# Patient Record
Sex: FEMALE | Race: WHITE | NOT HISPANIC OR LATINO | Employment: STUDENT | URBAN - METROPOLITAN AREA
[De-identification: names, ages, dates, MRNs, and addresses within clinical notes are randomized per-mention and may not be internally consistent; named-entity substitution may affect disease eponyms.]

---
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
## 2020-09-14 ENCOUNTER — OFFICE VISIT (OUTPATIENT)
Dept: FAMILY MEDICINE CLINIC | Facility: CLINIC | Age: 20
End: 2020-09-14
Payer: COMMERCIAL

## 2020-09-14 VITALS
RESPIRATION RATE: 18 BRPM | HEART RATE: 79 BPM | BODY MASS INDEX: 21.63 KG/M2 | WEIGHT: 134.6 LBS | OXYGEN SATURATION: 98 % | DIASTOLIC BLOOD PRESSURE: 50 MMHG | SYSTOLIC BLOOD PRESSURE: 92 MMHG | TEMPERATURE: 97.7 F | HEIGHT: 66 IN

## 2020-09-14 DIAGNOSIS — Z30.011 BCP (BIRTH CONTROL PILLS) INITIATION: ICD-10-CM

## 2020-09-14 DIAGNOSIS — Z00.00 ANNUAL PHYSICAL EXAM: Primary | ICD-10-CM

## 2020-09-14 DIAGNOSIS — Z30.09 BIRTH CONTROL COUNSELING: ICD-10-CM

## 2020-09-14 DIAGNOSIS — Z23 ENCOUNTER FOR IMMUNIZATION: ICD-10-CM

## 2020-09-14 PROBLEM — F12.90 MARIJUANA SMOKER, CONTINUOUS: Status: RESOLVED | Noted: 2018-11-05 | Resolved: 2020-09-14

## 2020-09-14 PROCEDURE — 90651 9VHPV VACCINE 2/3 DOSE IM: CPT | Performed by: FAMILY MEDICINE

## 2020-09-14 PROCEDURE — 90471 IMMUNIZATION ADMIN: CPT | Performed by: FAMILY MEDICINE

## 2020-09-14 PROCEDURE — 3725F SCREEN DEPRESSION PERFORMED: CPT | Performed by: FAMILY MEDICINE

## 2020-09-14 PROCEDURE — 99395 PREV VISIT EST AGE 18-39: CPT | Performed by: FAMILY MEDICINE

## 2020-09-14 RX ORDER — NORGESTIMATE AND ETHINYL ESTRADIOL 7DAYSX3 LO
1 KIT ORAL DAILY
Qty: 28 TABLET | Refills: 5 | Status: SHIPPED | OUTPATIENT
Start: 2020-09-14 | End: 2021-03-03 | Stop reason: SDUPTHER

## 2020-09-14 NOTE — PROGRESS NOTES
[facility]    NAME: Kely Steele  AGE: 21 y o  SEX: female  : 2000     DATE: 2020     Assessment and Plan:     Problem List Items Addressed This Visit     None      Visit Diagnoses     Annual physical exam    -  Primary    Encounter for immunization        Relevant Orders    HPV VACCINE 9 VALENT IM (Completed)    Birth control counseling        Relevant Medications    norgestimate-ethinyl estradiol (Ortho Tri-Cyclen Lo) 0 18/0 215/0 25 MG-25 MCG per tablet    BCP (birth control pills) initiation        Relevant Medications    norgestimate-ethinyl estradiol (Ortho Tri-Cyclen Lo) 0 18/0 215/0 25 MG-25 MCG per tablet          Immunizations and preventive care screenings were discussed with patient today  Appropriate education was printed on patient's after visit summary  Counseling:  Alcohol/drug use: discussed moderation in alcohol intake, the recommendations for healthy alcohol use, and avoidance of illicit drug use  Dental Health: discussed importance of regular tooth brushing, flossing, and dental visits  Injury prevention: discussed safety/seat belts, safety helmets, smoke detectors, carbon dioxide detectors, and smoking near bedding or upholstery  Sexual health: discussed sexually transmitted diseases, partner selection, use of condoms, avoidance of unintended pregnancy, and contraceptive alternatives  · Exercise: the importance of regular exercise/physical activity was discussed  Recommend exercise 3-5 times per week for at least 30 minutes  Return in about 1 year (around 2021)  Chief Complaint:     Chief Complaint   Patient presents with    Physical Exam      History of Present Illness:     Adult Annual Physical   Patient here for a comprehensive physical exam  The patient reports no problems  Diet and Physical Activity  · Diet/Nutrition: well balanced diet  · Exercise: no formal exercise  Depression Screening  PHQ-9 Depression Screening    PHQ-9:    Frequency of the following problems over the past two weeks:       Little interest or pleasure in doing things:  0 - not at all  Feeling down, depressed, or hopeless:  0 - not at all  PHQ-2 Score:  0       General Health  · Sleep: sleeps well  · Hearing: normal - bilateral   · Vision: no vision problems  · Dental: regular dental visits and brushes teeth twice daily  /GYN Health  · Last menstrual period: 9/9/2020  · Contraceptive method: barrier methods  · History of STDs?: no      Review of Systems:     Review of Systems   Constitutional: Negative for appetite change, chills, fatigue and fever  HENT: Negative  Respiratory: Negative for apnea, cough, shortness of breath and wheezing  Cardiovascular: Negative for chest pain, palpitations and leg swelling  Gastrointestinal: Negative for abdominal pain, constipation, diarrhea, nausea and vomiting  Genitourinary: Negative for decreased urine volume, dysuria, frequency, hematuria, urgency, vaginal bleeding, vaginal discharge and vaginal pain  Skin: Negative for color change and rash  Neurological: Negative  Psychiatric/Behavioral: Negative  Past Medical History:     Past Medical History:   Diagnosis Date    Ear lump     last assessed 2/27/15    Exercise-induced bronchospasm     No known problems 08/01/2014      Past Surgical History:     History reviewed  No pertinent surgical history     Social History:        Social History     Socioeconomic History    Marital status: Single     Spouse name: None    Number of children: None    Years of education: None    Highest education level: None   Occupational History    None   Social Needs    Financial resource strain: None    Food insecurity     Worry: None     Inability: None    Transportation needs     Medical: None     Non-medical: None   Tobacco Use    Smoking status: Never Smoker    Smokeless tobacco: Never Used Substance and Sexual Activity    Alcohol use: Yes     Comment: rare    Drug use: No    Sexual activity: None   Lifestyle    Physical activity     Days per week: None     Minutes per session: None    Stress: None   Relationships    Social connections     Talks on phone: None     Gets together: None     Attends Advent service: None     Active member of club or organization: None     Attends meetings of clubs or organizations: None     Relationship status: None    Intimate partner violence     Fear of current or ex partner: None     Emotionally abused: None     Physically abused: None     Forced sexual activity: None   Other Topics Concern    None   Social History Narrative    Currently in school    Lives with parents (living together,)      Family History:     Family History   Problem Relation Age of Onset    Asthma Mother     Diabetes Mother     Lung cancer Mother     Asthma Father     Diabetes Father     Lung cancer Father       Current Medications:     Current Outpatient Medications   Medication Sig Dispense Refill    erythromycin with ethanol (THERAMYCIN) 2 % external solution Apply topically daily as needed (cystic acne) 60 mL 3    BENZOYL PEROXIDE 10 % external wash APPLY 1 APPLICATION TOPICALLY AT BEDTIME AS NEEDED FOR ACNE  1    benzoyl peroxide 5 % gel APPLY THIN LAYER TOPICALLY AT BEDTIME  3    Benzoyl Peroxide 6 % LOTN Apply topically daily at bedtime (Patient not taking: Reported on 9/14/2020) 50 g 3    doxycycline monohydrate (MONODOX) 50 mg capsule Take 50 mg by mouth 2 (two) times a day  1    norgestimate-ethinyl estradiol (Ortho Tri-Cyclen Lo) 0 18/0 215/0 25 MG-25 MCG per tablet Take 1 tablet by mouth daily 28 tablet 5     No current facility-administered medications for this visit         Allergies:     No Known Allergies   Physical Exam:     BP 92/50   Pulse 79   Temp 97 7 °F (36 5 °C)   Resp 18   Ht 5' 5 8" (1 671 m)   Wt 61 1 kg (134 lb 9 6 oz)   SpO2 98% BMI 21 86 kg/m²     Physical Exam  Vitals signs and nursing note reviewed  Constitutional:       General: She is not in acute distress  Appearance: Normal appearance  She is well-developed and normal weight  She is not ill-appearing, toxic-appearing or diaphoretic  HENT:      Head: Normocephalic and atraumatic  Right Ear: Tympanic membrane, ear canal and external ear normal  There is no impacted cerumen  Left Ear: Tympanic membrane, ear canal and external ear normal  There is no impacted cerumen  Nose: Nose normal  No congestion or rhinorrhea  Mouth/Throat:      Pharynx: No oropharyngeal exudate or posterior oropharyngeal erythema  Eyes:      General: No scleral icterus  Right eye: No discharge  Left eye: No discharge  Conjunctiva/sclera: Conjunctivae normal    Neck:      Musculoskeletal: Normal range of motion and neck supple  Thyroid: No thyromegaly  Vascular: No JVD  Trachea: No tracheal deviation  Cardiovascular:      Rate and Rhythm: Normal rate and regular rhythm  Heart sounds: Normal heart sounds  No murmur  No friction rub  No gallop  Pulmonary:      Effort: Pulmonary effort is normal  No respiratory distress  Breath sounds: Normal breath sounds  No stridor  No wheezing  Abdominal:      General: Bowel sounds are normal       Palpations: Abdomen is soft  There is no mass  Tenderness: There is no abdominal tenderness  Musculoskeletal: Normal range of motion  General: No tenderness or deformity  Lymphadenopathy:      Cervical: No cervical adenopathy  Skin:     General: Skin is warm  Coloration: Skin is not pale  Findings: No erythema  Neurological:      General: No focal deficit present  Mental Status: She is alert and oriented to person, place, and time  Mental status is at baseline     Psychiatric:         Mood and Affect: Mood normal          Behavior: Behavior normal          Thought Content: Thought content normal          Judgment: Judgment normal           Jose Cortes, DO   [address]

## 2020-09-15 ENCOUNTER — TELEMEDICINE (OUTPATIENT)
Dept: FAMILY MEDICINE CLINIC | Facility: CLINIC | Age: 20
End: 2020-09-15
Payer: COMMERCIAL

## 2020-09-15 DIAGNOSIS — R09.81 NASAL CONGESTION: ICD-10-CM

## 2020-09-15 DIAGNOSIS — R09.82 POST-NASAL DRIP: Primary | ICD-10-CM

## 2020-09-15 DIAGNOSIS — J34.89 RHINORRHEA: ICD-10-CM

## 2020-09-15 PROCEDURE — 99213 OFFICE O/P EST LOW 20 MIN: CPT | Performed by: FAMILY MEDICINE

## 2020-09-15 PROCEDURE — 1036F TOBACCO NON-USER: CPT | Performed by: FAMILY MEDICINE

## 2020-09-15 RX ORDER — FLUTICASONE PROPIONATE 50 MCG
1 SPRAY, SUSPENSION (ML) NASAL DAILY
Qty: 1 BOTTLE | Refills: 2 | Status: SHIPPED | OUTPATIENT
Start: 2020-09-15 | End: 2022-04-15

## 2020-09-15 NOTE — PROGRESS NOTES
Virtual Brief Visit    Assessment/Plan:    Problem List Items Addressed This Visit     None      Visit Diagnoses     Post-nasal drip    -  Primary    Relevant Medications    fluticasone (FLONASE) 50 mcg/act nasal spray    Rhinorrhea        Relevant Medications    fluticasone (FLONASE) 50 mcg/act nasal spray    Nasal congestion            Viral URI  - Conservative measures discussed including humidifier use for congestion and warm water/tea with honey for sore throat  - Discussed the importance of avoiding unnecessary antibiotic therapy  - Tylenol or Motrin prn for fevers  - Flonase ordered   - Call in 2-3 days if symptoms aren't improving, return precautions discussed  - Follow up as needed  Reason for visit is   Chief Complaint   Patient presents with    Virtual Brief Visit        Encounter provider Grace Meraz DO    Provider located at [address] [place] [phone]    Recent Visits  Date Type Provider Dept   09/15/20 Telemedicine Grace Meraz DO [id number]   09/14/20 Office Visit Grace Meraz DO  [facility]   Showing recent visits within past 7 days and meeting all other requirements     Future Appointments  No visits were found meeting these conditions  Showing future appointments within next 150 days and meeting all other requirements        After connecting through telephone and patient was informed that this is not a secure, HIPAA-complaint platform  She agrees to proceed  , the patient was identified by name and date of birth  Vicky Colbert was informed that this is a telemedicine visit and that the visit is being conducted through telephone and patient was informed that this is not a secure, HIPAA-complaint platform  She agrees to proceed     My office door was closed  No one else was in the room  She acknowledged consent and understanding of privacy and security of the platform   The patient has agreed to participate and understands she can discontinue the visit at any time  Patient is aware this is a billable service  Howie Figueroa is a 21 y o  female   21year old female is seen via telemedicine telephone call complaining of nasal congestion  Symptoms include congestion, nasal congestion, no  fever, post nasal drip and rhinorrhea  Onset of symptoms was 2 days ago, and has been unchanged since that time  Treatment to date: none  Past Medical History:   Diagnosis Date    Ear lump     last assessed 2/27/15    Exercise-induced bronchospasm     No known problems 08/01/2014       No past surgical history on file  Current Outpatient Medications   Medication Sig Dispense Refill    BENZOYL PEROXIDE 10 % external wash APPLY 1 APPLICATION TOPICALLY AT BEDTIME AS NEEDED FOR ACNE  1    benzoyl peroxide 5 % gel APPLY THIN LAYER TOPICALLY AT BEDTIME  3    Benzoyl Peroxide 6 % LOTN Apply topically daily at bedtime (Patient not taking: Reported on 9/14/2020) 50 g 3    doxycycline monohydrate (MONODOX) 50 mg capsule Take 50 mg by mouth 2 (two) times a day  1    erythromycin with ethanol (THERAMYCIN) 2 % external solution Apply topically daily as needed (cystic acne) 60 mL 3    fluticasone (FLONASE) 50 mcg/act nasal spray 1 spray into each nostril daily 1 Bottle 2    norgestimate-ethinyl estradiol (Ortho Tri-Cyclen Lo) 0 18/0 215/0 25 MG-25 MCG per tablet Take 1 tablet by mouth daily 28 tablet 5     No current facility-administered medications for this visit  No Known Allergies    Review of Systems  As per HPI     There were no vitals filed for this visit  I spent 15 minutes directly with the patient during this visit    It was my intent to perform this visit via video technology but the patient was not able to do a video connection so the visit was completed via audio telephone only    VIRTUAL VISIT DISCLAIMER    Kely Ivette acknowledges that she has consented to an online visit or consultation  She understands that the online visit is based solely on information provided by her, and that, in the absence of a face-to-face physical evaluation by the physician, the diagnosis she receives is both limited and provisional in terms of accuracy and completeness  This is not intended to replace a full medical face-to-face evaluation by the physician  Kely Steele understands and accepts these terms

## 2021-03-03 DIAGNOSIS — Z30.09 BIRTH CONTROL COUNSELING: ICD-10-CM

## 2021-03-03 DIAGNOSIS — Z30.011 BCP (BIRTH CONTROL PILLS) INITIATION: ICD-10-CM

## 2021-03-03 NOTE — TELEPHONE ENCOUNTER
Refill request for norgestimate-ethinyl estradiol to be sent to [address] [place] [address]

## 2021-03-04 RX ORDER — NORGESTIMATE AND ETHINYL ESTRADIOL 7DAYSX3 LO
1 KIT ORAL DAILY
Qty: 28 TABLET | Refills: 5 | Status: SHIPPED | OUTPATIENT
Start: 2021-03-04 | End: 2021-08-09 | Stop reason: SDUPTHER

## 2021-08-09 DIAGNOSIS — Z30.09 BIRTH CONTROL COUNSELING: ICD-10-CM

## 2021-08-09 DIAGNOSIS — Z30.011 BCP (BIRTH CONTROL PILLS) INITIATION: ICD-10-CM

## 2021-08-10 ENCOUNTER — TELEPHONE (OUTPATIENT)
Dept: FAMILY MEDICINE CLINIC | Facility: CLINIC | Age: 21
End: 2021-08-10

## 2021-08-10 RX ORDER — NORGESTIMATE AND ETHINYL ESTRADIOL 7DAYSX3 LO
1 KIT ORAL DAILY
Qty: 28 TABLET | Refills: 5 | Status: SHIPPED | OUTPATIENT
Start: 2021-08-10

## 2021-08-10 NOTE — TELEPHONE ENCOUNTER
----- Message from Nick Bansal DO sent at 8/10/2021  8:43 AM EDT -----  Regarding: needs appt  Hi,     Please make an appointment for this patient to address contraception surveillance next month for further refills   Thanks     -Dr David Cantu

## 2021-08-10 NOTE — TELEPHONE ENCOUNTER
Left patient a VM asking to please call the office to set up an apt      ----- Message from Darlene Heath DO sent at 8/10/2021  8:43 AM EDT -----  Regarding: needs appt  Hi,     Please make an appointment for this patient to address contraception surveillance next month for further refills   Thanks     -Dr Puneet Badillo

## 2021-09-14 ENCOUNTER — VBI (OUTPATIENT)
Dept: ADMINISTRATIVE | Facility: OTHER | Age: 21
End: 2021-09-14

## 2021-09-16 ENCOUNTER — OFFICE VISIT (OUTPATIENT)
Dept: FAMILY MEDICINE CLINIC | Facility: CLINIC | Age: 21
End: 2021-09-16
Payer: COMMERCIAL

## 2021-09-16 VITALS
HEART RATE: 84 BPM | TEMPERATURE: 98.6 F | HEIGHT: 66 IN | DIASTOLIC BLOOD PRESSURE: 70 MMHG | BODY MASS INDEX: 24.75 KG/M2 | SYSTOLIC BLOOD PRESSURE: 118 MMHG | WEIGHT: 154 LBS | RESPIRATION RATE: 20 BRPM | OXYGEN SATURATION: 97 %

## 2021-09-16 DIAGNOSIS — Z23 ENCOUNTER FOR IMMUNIZATION: Primary | ICD-10-CM

## 2021-09-16 DIAGNOSIS — Z00.00 ANNUAL PHYSICAL EXAM: ICD-10-CM

## 2021-09-16 PROCEDURE — 90471 IMMUNIZATION ADMIN: CPT | Performed by: FAMILY MEDICINE

## 2021-09-16 PROCEDURE — 90715 TDAP VACCINE 7 YRS/> IM: CPT | Performed by: FAMILY MEDICINE

## 2021-09-16 PROCEDURE — 3725F SCREEN DEPRESSION PERFORMED: CPT | Performed by: FAMILY MEDICINE

## 2021-09-16 PROCEDURE — 90472 IMMUNIZATION ADMIN EACH ADD: CPT | Performed by: FAMILY MEDICINE

## 2021-09-16 PROCEDURE — 99395 PREV VISIT EST AGE 18-39: CPT | Performed by: FAMILY MEDICINE

## 2021-09-16 PROCEDURE — 3008F BODY MASS INDEX DOCD: CPT | Performed by: FAMILY MEDICINE

## 2021-09-16 PROCEDURE — 90621 MENB-FHBP VACC 2/3 DOSE IM: CPT | Performed by: FAMILY MEDICINE

## 2021-09-16 NOTE — PROGRESS NOTES
[facility]    NAME: Kely Steele  AGE: 24 y o  SEX: female  : 2000     DATE: 2021     Assessment and Plan:     Problem List Items Addressed This Visit     None      Visit Diagnoses     Encounter for immunization    -  Primary    Relevant Orders    Tdap vaccine greater than or equal to 8yo IM (Completed)    MENINGOCOCCAL B RECOMBINANT (Completed)        1  Encounter for immunization  - Tdap vaccine greater than or equal to 8yo IM  - MENINGOCOCCAL B RECOMBINANT    2  Annual physical exam          Immunizations and preventive care screenings were discussed with patient today  Appropriate education was printed on patient's after visit summary  Counseling:  Alcohol/drug use: discussed moderation in alcohol intake, the recommendations for healthy alcohol use, and avoidance of illicit drug use  Dental Health: discussed importance of regular tooth brushing, flossing, and dental visits  Sexual health: discussed sexually transmitted diseases, partner selection, use of condoms, avoidance of unintended pregnancy, and contraceptive alternatives  · Exercise: the importance of regular exercise/physical activity was discussed  Recommend exercise 3-5 times per week for at least 30 minutes  No follow-ups on file  Chief Complaint:     Chief Complaint   Patient presents with    Follow-up      History of Present Illness:     Adult Annual Physical   Patient here for a comprehensive physical exam  The patient reports no complaints  She is currently working and looking to go to [employer]  Diet and Physical Activity  · Diet/Nutrition: well balanced diet and limited junk food  · Exercise: no formal exercise        Depression Screening  PHQ-9 Depression Screening    PHQ-9:   Frequency of the following problems over the past two weeks:      Little interest or pleasure in doing things: 0 - not at all  Feeling down, depressed, or hopeless: 0 - not at all  PHQ-2 Score: 0       General Health  · Sleep: patient wakes up about 3-4 times a night, tried melatonin but states she does wake up  even with it  · Hearing: normal - bilateral   · Vision: no vision problems  · Dental: regular dental visits  /GYN Health  · Last menstrual period: 9/13/21  · Contraceptive method: oral contraceptives  · History of STDs?: no      Review of Systems:     Review of Systems   All other systems reviewed and are negative  Past Medical History:     Past Medical History:   Diagnosis Date    Ear lump     last assessed 2/27/15    Exercise-induced bronchospasm     No known problems 08/01/2014      Past Surgical History:     No past surgical history on file  Social History:     Social History     Socioeconomic History    Marital status: Single     Spouse name: Not on file    Number of children: Not on file    Years of education: Not on file    Highest education level: Not on file   Occupational History    Not on file   Tobacco Use    Smoking status: Never Smoker    Smokeless tobacco: Never Used   Substance and Sexual Activity    Alcohol use: Yes     Comment: rare    Drug use: No    Sexual activity: Not on file   Other Topics Concern    Not on file   Social History Narrative    Currently in school    Lives with parents (living together,)     Social Determinants of Health     Financial Resource Strain:     Difficulty of Paying Living Expenses:    Food Insecurity:     Worried About Running Out of Food in the Last Year:     [address] in the Last Year:    Transportation Needs:     Lack of Transportation (Medical):      Lack of Transportation (Non-Medical):    Physical Activity:     Days of Exercise per Week:     Minutes of Exercise per Session:    Stress:     Feeling of Stress :    Social Connections:     Frequency of Communication with Friends and Family:     Frequency of Social Gatherings with Friends and Family:     Attends [employer] Services:     Active Member of Clubs or Organizations:     Attends Club or Organization Meetings:     Marital Status:    Intimate Partner Violence:     Fear of Current or Ex-Partner:     Emotionally Abused:     Physically Abused:     Sexually Abused:       Family History:     Family History   Problem Relation Age of Onset    Asthma Mother     Diabetes Mother     Lung cancer Mother     Asthma Father     Diabetes Father     Lung cancer Father       Current Medications:     Current Outpatient Medications   Medication Sig Dispense Refill    norgestimate-ethinyl estradiol (Ortho Tri-Cyclen Lo) 0 18/0 215/0 25 MG-25 MCG per tablet Take 1 tablet by mouth daily 28 tablet 5    BENZOYL PEROXIDE 10 % external wash APPLY 1 APPLICATION TOPICALLY AT BEDTIME AS NEEDED FOR ACNE (Patient not taking: Reported on 9/16/2021)  1    benzoyl peroxide 5 % gel APPLY THIN LAYER TOPICALLY AT BEDTIME (Patient not taking: Reported on 9/16/2021)  3    Benzoyl Peroxide 6 % LOTN Apply topically daily at bedtime (Patient not taking: Reported on 9/14/2020) 50 g 3    doxycycline monohydrate (MONODOX) 50 mg capsule Take 50 mg by mouth 2 (two) times a day (Patient not taking: Reported on 9/16/2021)  1    erythromycin with ethanol (THERAMYCIN) 2 % external solution Apply topically daily as needed (cystic acne) (Patient not taking: Reported on 9/16/2021) 60 mL 3    fluticasone (FLONASE) 50 mcg/act nasal spray 1 spray into each nostril daily (Patient not taking: Reported on 9/16/2021) 1 Bottle 2     No current facility-administered medications for this visit  Allergies:     No Known Allergies   Physical Exam:     /70   Pulse 84   Temp 98 6 °F (37 °C)   Resp 20   Ht 5' 6" (1 676 m)   Wt 69 9 kg (154 lb)   SpO2 97%   BMI 24 86 kg/m²     Physical Exam  Constitutional:       Appearance: Normal appearance  HENT:      Head: Normocephalic and atraumatic        Right Ear: Tympanic membrane, ear canal and external ear normal  There is no impacted cerumen  Left Ear: Tympanic membrane, ear canal and external ear normal  There is no impacted cerumen  Cardiovascular:      Rate and Rhythm: Normal rate and regular rhythm  Pulses: Normal pulses  Heart sounds: Normal heart sounds  Pulmonary:      Effort: Pulmonary effort is normal       Breath sounds: Normal breath sounds  Abdominal:      General: Bowel sounds are normal  There is no distension  Tenderness: There is no abdominal tenderness  Musculoskeletal:      Cervical back: Normal range of motion  Neurological:      General: No focal deficit present  Mental Status: She is alert and oriented to person, place, and time  Psychiatric:         Mood and Affect: Mood normal          Behavior: Behavior normal          Thought Content:  Thought content normal          Judgment: Judgment normal           Fatou Dey MD   [address]

## 2022-04-15 ENCOUNTER — TELEMEDICINE (OUTPATIENT)
Dept: FAMILY MEDICINE CLINIC | Facility: CLINIC | Age: 22
End: 2022-04-15
Payer: COMMERCIAL

## 2022-04-15 DIAGNOSIS — J32.9 SINUSITIS, UNSPECIFIED CHRONICITY, UNSPECIFIED LOCATION: Primary | ICD-10-CM

## 2022-04-15 PROCEDURE — 99212 OFFICE O/P EST SF 10 MIN: CPT | Performed by: FAMILY MEDICINE

## 2022-04-15 RX ORDER — FLUTICASONE PROPIONATE 50 MCG
1 SPRAY, SUSPENSION (ML) NASAL DAILY
Qty: 11.1 ML | Refills: 0 | Status: SHIPPED | OUTPATIENT
Start: 2022-04-15

## 2022-04-15 NOTE — PROGRESS NOTES
COVID-19 Outpatient Progress Note    Assessment/Plan:    Problem List Items Addressed This Visit     None         COVID-19 Plan   Encounter provider Anjana Sanchez MD    Provider located at [address] [place] [phone]    Recent Visits  No visits were found meeting these conditions  Showing recent visits within past 7 days and meeting all other requirements  Today's Visits  Date Type Provider Dept   04/15/22 Telemedicine Anjana Sanchez MD [facility]   Showing today's visits and meeting all other requirements  Future Appointments  No visits were found meeting these conditions  Showing future appointments within next 150 days and meeting all other requirements     COVID-19 HPI  Lab Results   Component Value Date    SARSCOV2 NOT DETECTED 08/16/2021     Past Medical History:   Diagnosis Date    Ear lump     last assessed 2/27/15    Exercise-induced bronchospasm     No known problems 08/01/2014     No past surgical history on file    Current Outpatient Medications   Medication Sig Dispense Refill    BENZOYL PEROXIDE 10 % external wash APPLY 1 APPLICATION TOPICALLY AT BEDTIME AS NEEDED FOR ACNE (Patient not taking: Reported on 9/16/2021)  1    benzoyl peroxide 5 % gel APPLY THIN LAYER TOPICALLY AT BEDTIME (Patient not taking: Reported on 9/16/2021)  3    Benzoyl Peroxide 6 % LOTN Apply topically daily at bedtime (Patient not taking: Reported on 9/14/2020) 50 g 3    doxycycline monohydrate (MONODOX) 50 mg capsule Take 50 mg by mouth 2 (two) times a day (Patient not taking: Reported on 9/16/2021)  1    erythromycin with ethanol (THERAMYCIN) 2 % external solution Apply topically daily as needed (cystic acne) (Patient not taking: Reported on 9/16/2021) 60 mL 3    fluticasone (FLONASE) 50 mcg/act nasal spray 1 spray into each nostril daily (Patient not taking: Reported on 9/16/2021) 1 Bottle 2    norgestimate-ethinyl estradiol (Ortho Tri-Cyclen Lo) 0 18/0 215/0 25 MG-25 MCG per tablet Take 1 tablet by mouth daily 28 tablet 5     No current facility-administered medications for this visit  No Known Allergies    Review of Systems  Objective: There were no vitals filed for this visit  Physical Exam    VIRTUAL VISIT DISCLAIMER    Kely Steele verbally agrees to participate in [employer]  Pt is aware that [employer] could be limited without vital signs or the ability to perform a full hands-on physical exam  Kely Steele understands she or the provider may request at any time to terminate the video visit and request the patient to seek care or treatment in person

## 2022-04-15 NOTE — PROGRESS NOTES
[id number]  Akshat Raman MD, 04/15/22     Brit Orozco MRN: [id number] : 2000 Age: 24 y o  Assessment/Plan        1  Sinusitis, unspecified chronicity, unspecified location  fluticasone (FLONASE) 50 mcg/act nasal spray         ***    Kelysuzan Steele acknowledged understanding of treatment plan, all questions answered  Reminded of office on-call physician availability  for any concerns  Plan discussed with attending physician Dr Eddie Ferrer Saba Salgado is a 24 y o  female  Patient complains of feeling ill for several days  She was at a [employer] party on Saturday evening, reports that persons at party have been diagnosed with bacterial infection also had black mold at house  She went for hike on Tuesday, felt ill after  Body aches, fevers, chills  TMax 101 5  Today patient reports that she is feeling better  Had COVID previously, unvaccinated  The following portions of the patient's history were reviewed and updated as appropriate: allergies, current medications, past family history, past medical history, past social history, past surgical history and problem list     Review of Systems   Constitutional: Positive for fever  Negative for chills  HENT: Positive for rhinorrhea and sinus pressure  Musculoskeletal: Positive for myalgias  Negative for arthralgias           Past Medical History:   Diagnosis Date    Ear lump     last assessed 2/27/15    Exercise-induced bronchospasm     No known problems 2014       Current Outpatient Medications   Medication Sig Dispense Refill    BENZOYL PEROXIDE 10 % external wash APPLY 1 APPLICATION TOPICALLY AT BEDTIME AS NEEDED FOR ACNE (Patient not taking: Reported on 2021)  1    benzoyl peroxide 5 % gel APPLY THIN LAYER TOPICALLY AT BEDTIME (Patient not taking: Reported on 2021)  3    Benzoyl Peroxide 6 % LOTN Apply topically daily at bedtime (Patient not taking: Reported on 2020) 50 g 3    doxycycline monohydrate (MONODOX) 50 mg capsule Take 50 mg by mouth 2 (two) times a day (Patient not taking: Reported on 9/16/2021)  1    erythromycin with ethanol (THERAMYCIN) 2 % external solution Apply topically daily as needed (cystic acne) (Patient not taking: Reported on 9/16/2021) 60 mL 3    fluticasone (FLONASE) 50 mcg/act nasal spray 1 spray into each nostril daily (Patient not taking: Reported on 9/16/2021) 1 Bottle 2    norgestimate-ethinyl estradiol (Ortho Tri-Cyclen Lo) 0 18/0 215/0 25 MG-25 MCG per tablet Take 1 tablet by mouth daily 28 tablet 5     No current facility-administered medications for this visit  Objective      There were no vitals taken for this visit      Physical Exam

## 2022-04-19 NOTE — PROGRESS NOTES
Virtual Regular Visit    Verification of patient location:    Patient is located in the following state in which I hold an active license [place]      Assessment/Plan:    Problem List Items Addressed This Visit     None      Visit Diagnoses     Sinusitis, unspecified chronicity, unspecified location    -  Primary    Relevant Medications    fluticasone (FLONASE) 50 mcg/act nasal spray        25 y/o female with URI symptoms starting few days ago, now feeling better without treatment  Patient concerned that she was at party, where other people reportedly diagnosed with bacterial infection  · As patient is feeling better today, it is unlikely that this a bacterial infection  No need for antibiotics today  · For congestion, patient can use flonase  Order provided  · Continue symptomatic treatment  · Advised patient to call for any worsening symptoms  Reason for visit is   Chief Complaint   Patient presents with    COVID-19    Virtual Regular Visit        Encounter provider Melinda Lan MD    Provider located at [address] [place] [phone]      Recent Visits  Date Type Provider [facility]   [facility]   Showing recent visits within past 7 days and meeting all other requirements  Future Appointments  No visits were found meeting these conditions  Showing future appointments within next 150 days and meeting all other requirements       The patient was identified by name and date of birth  Raz Cardenas was informed that this is a telemedicine visit and that the visit is being conducted through Telephone  My office door was closed  No one else was in the room  She acknowledged consent and understanding of privacy and security of the video platform  The patient has agreed to participate and understands they can discontinue the visit at any time      Patient is aware this is a billable service  It was my intent to perform this visit via video technology but the patient was not able to do a video connection so the visit was completed via audio telephone only  Khloe Farnsworth is a 24 y o  female   Patient complains of congestion, body aches, fever, and chills starting 4/12  Patient reports that prior to feeling ill, she went to a [employer] party on Saturday evening, reports that she was informed that other persons at party have been diagnosed with bacterial infection also that house had black mold  Patient unsure what type of infection the other people at the party had  Patient felt well initially after the party, she then started developing symptoms after she went for a hike on Tuesday  She checked her temperature at home, found to febrile to 101 5  Patient reports that today she is feeling better  She has not had any recurrent fevers  She has some persistent congestion, but otherwise feels ok  Had COVID previously, is unvaccinated  Past Medical History:   Diagnosis Date    Ear lump     last assessed 2/27/15    Exercise-induced bronchospasm     No known problems 08/01/2014       No past surgical history on file  Current Outpatient Medications   Medication Sig Dispense Refill    fluticasone (FLONASE) 50 mcg/act nasal spray 1 spray into each nostril daily 11 1 mL 0    norgestimate-ethinyl estradiol (Ortho Tri-Cyclen Lo) 0 18/0 215/0 25 MG-25 MCG per tablet Take 1 tablet by mouth daily 28 tablet 5     No current facility-administered medications for this visit  No Known Allergies    Review of Systems    Video Exam    There were no vitals filed for this visit  Physical Exam     I spent 15 minutes directly with the patient during this visit    VIRTUAL VISIT DISCLAIMER      Kely Steele verbally agrees to participate in [employer]   Pt is aware that [employer] could be limited without vital signs or the ability to perform a full hands-on physical maritza Dey understands she or the provider may request at any time to terminate the video visit and request the patient to seek care or treatment in person

## 2022-09-26 ENCOUNTER — VBI (OUTPATIENT)
Dept: ADMINISTRATIVE | Facility: OTHER | Age: 22
End: 2022-09-26

## 2022-10-31 ENCOUNTER — OFFICE VISIT (OUTPATIENT)
Dept: FAMILY MEDICINE CLINIC | Facility: CLINIC | Age: 22
End: 2022-10-31

## 2022-10-31 VITALS
HEART RATE: 70 BPM | HEIGHT: 66 IN | WEIGHT: 164.44 LBS | OXYGEN SATURATION: 98 % | DIASTOLIC BLOOD PRESSURE: 64 MMHG | BODY MASS INDEX: 26.43 KG/M2 | RESPIRATION RATE: 20 BRPM | TEMPERATURE: 98.1 F | SYSTOLIC BLOOD PRESSURE: 106 MMHG

## 2022-10-31 DIAGNOSIS — Z00.00 ANNUAL PHYSICAL EXAM: ICD-10-CM

## 2022-10-31 DIAGNOSIS — Z23 ENCOUNTER FOR IMMUNIZATION: Primary | ICD-10-CM

## 2022-10-31 DIAGNOSIS — E66.3 OVERWEIGHT (BMI 25.0-29.9): ICD-10-CM

## 2022-10-31 NOTE — PROGRESS NOTES
[address] [facility]    NAME: Kely Steele  AGE: 25 y o  SEX: female  : 2000     DATE: 2022     Assessment and Plan:     Problem List Items Addressed This Visit    None     Visit Diagnoses     Encounter for immunization    -  Primary    Relevant Orders    influenza vaccine, quadrivalent, 0 5 mL, preservative-free, for adult and pediatric patients 6 mos+ (AFLURIA, FLUARIX, FLULAVAL, FLUZONE) (Completed)    Overweight (BMI 25 0-29  9)        Relevant Orders    CBC and differential    Lipid Panel with Direct LDL reflex    TSH, 3rd generation with Free T4 reflex    Comprehensive metabolic panel    Annual physical exam        Relevant Orders    CBC and differential    Lipid Panel with Direct LDL reflex    TSH, 3rd generation with Free T4 reflex    Comprehensive metabolic panel          Immunizations and preventive care screenings were discussed with patient today  Appropriate education was printed on patient's after visit summary  ·          No follow-ups on file  Chief Complaint:     Chief Complaint   Patient presents with   • Physical Exam     No questions or concerns      History of Present Illness:     Adult Annual Physical   Patient here for a comprehensive physical exam  The patient reports no problems  Occasional drinking and no smoking    Diet and Physical Activity  · Diet/Nutrition: well balanced diet, limited junk food and consuming 3-5 servings of fruits/vegetables daily  · Exercise: no formal exercise        Depression Screening  PHQ-2/9 Depression Screening    Little interest or pleasure in doing things: 0 - not at all  Feeling down, depressed, or hopeless: 0 - not at all  Trouble falling or staying asleep, or sleeping too much: 1 - several days  Feeling tired or having little energy: 0 - not at all  Poor appetite or overeatin - not at all  Feeling bad about yourself - or that you are a failure or have let yourself or your family down: 0 - not at all  Trouble concentrating on things, such as reading the newspaper or watching television: 0 - not at all  Moving or speaking so slowly that other people could have noticed  Or the opposite - being so fidgety or restless that you have been moving around a lot more than usual: 0 - not at all  Thoughts that you would be better off dead, or of hurting yourself in some way: 0 - not at all  PHQ-2 Score: 0  PHQ-2 Interpretation: Negative depression screen  PHQ-9 Score: 1   PHQ-9 Interpretation: No or Minimal depression        General Health  · Sleep: sleeps well and gets 7-8 hours of sleep on average  · Hearing: normal - bilateral   · Vision: no vision problems  · Dental: regular dental visits  /GYN Health  · Last menstrual period: 10/9/2022  · Contraceptive method: oral contraceptives  Not currently sexually active  · History of STDs?: no      Review of Systems:     Review of Systems   Constitutional: Negative for activity change, appetite change and fatigue  HENT: Negative for congestion  Respiratory: Negative for cough, shortness of breath and wheezing  Cardiovascular: Negative for chest pain  Gastrointestinal: Negative for abdominal pain, constipation, diarrhea, nausea and vomiting  Musculoskeletal: Negative for arthralgias  Neurological: Negative for weakness, light-headedness and headaches  Psychiatric/Behavioral: The patient is not nervous/anxious  Past Medical History:     Past Medical History:   Diagnosis Date   • Ear lump     last assessed 2/27/15   • Exercise-induced bronchospasm    • No known problems 08/01/2014      Past Surgical History:     History reviewed  No pertinent surgical history     Social History:     Social History     Socioeconomic History   • Marital status: Single     Spouse name: None   • Number of children: None   • Years of education: None   • Highest education level: None   Occupational History   • None   Tobacco Use   • Smoking status: Never Smoker   • Smokeless tobacco: Never Used   Substance and Sexual Activity   • Alcohol use: Yes     Comment: rare   • Drug use: No   • Sexual activity: None   Other Topics Concern   • None   Social History Narrative    Currently in school    Lives with parents (living together,)     Social Determinants of Health     Financial Resource Strain: Not on file   Food Insecurity: Not on file   Transportation Needs: Not on file   Physical Activity: Not on file   Stress: Not on file   Social Connections: Not on file   Intimate Partner Violence: Not on file   Housing Stability: Not on file      Family History:     Family History   Problem Relation Age of Onset   • Asthma Mother    • Diabetes Mother    • Lung cancer Mother    • Asthma Father    • Diabetes Father    • Lung cancer Father       Current Medications:     Current Outpatient Medications   Medication Sig Dispense Refill   • norgestimate-ethinyl estradiol (Ortho Tri-Cyclen Lo) 0 18/0 215/0 25 MG-25 MCG per tablet Take 1 tablet by mouth daily 28 tablet 5   • fluticasone (FLONASE) 50 mcg/act nasal spray 1 spray into each nostril daily (Patient not taking: Reported on 10/31/2022) 11 1 mL 0     No current facility-administered medications for this visit  Allergies:     No Known Allergies   Physical Exam:     /64 (BP Location: Left arm, Patient Position: Sitting, Cuff Size: Standard)   Pulse 70   Temp 98 1 °F (36 7 °C) (Tympanic)   Resp 20   Ht 5' 6" (1 676 m)   Wt 74 6 kg (164 lb 7 oz)   LMP 10/09/2022 (Exact Date)   SpO2 98%   BMI 26 54 kg/m²     Physical Exam  Constitutional:       Appearance: Normal appearance  HENT:      Head: Normocephalic and atraumatic        Right Ear: Tympanic membrane, ear canal and external ear normal       Left Ear: Tympanic membrane, ear canal and external ear normal       Nose: Nose normal       Mouth/Throat:      Mouth: Mucous membranes are moist    Eyes:      Extraocular Movements: Extraocular movements intact  Conjunctiva/sclera: Conjunctivae normal       Pupils: Pupils are equal, round, and reactive to light  Cardiovascular:      Rate and Rhythm: Normal rate and regular rhythm  Pulses: Normal pulses  Heart sounds: Normal heart sounds  Pulmonary:      Effort: Pulmonary effort is normal       Breath sounds: Normal breath sounds  Abdominal:      General: Bowel sounds are normal  There is no distension  Tenderness: There is no abdominal tenderness  Musculoskeletal:         General: Normal range of motion  Cervical back: Normal range of motion and neck supple  Skin:     General: Skin is warm  Neurological:      General: No focal deficit present  Mental Status: She is alert and oriented to person, place, and time  Psychiatric:         Mood and Affect: Mood normal          Behavior: Behavior normal          Thought Content:  Thought content normal          Judgment: Judgment normal           Juan José Ortiz MD   [address]

## 2022-11-10 ENCOUNTER — TELEPHONE (OUTPATIENT)
Dept: FAMILY MEDICINE CLINIC | Facility: CLINIC | Age: 22
End: 2022-11-10

## 2022-11-10 NOTE — TELEPHONE ENCOUNTER
Sequence record form    Scanned into encounter    Placed in [employer] [phone]

## 2023-02-28 LAB
ALBUMIN SERPL-MCNC: 4.4 G/DL (ref 3.9–5)
ALBUMIN/GLOB SERPL: 1.9 {RATIO} (ref 1.2–2.2)
ALP SERPL-CCNC: 32 IU/L (ref 44–121)
ALT SERPL-CCNC: 20 IU/L (ref 0–32)
AST SERPL-CCNC: 25 IU/L (ref 0–40)
BASOPHILS # BLD AUTO: 0 X10E3/UL (ref 0–0.2)
BASOPHILS NFR BLD AUTO: 1 %
BILIRUB SERPL-MCNC: 0.4 MG/DL (ref 0–1.2)
BUN SERPL-MCNC: 11 MG/DL (ref 6–20)
BUN/CREAT SERPL: 15 (ref 9–23)
CALCIUM SERPL-MCNC: 9.1 MG/DL (ref 8.7–10.2)
CHLORIDE SERPL-SCNC: 101 MMOL/L (ref 96–106)
CHOLEST SERPL-MCNC: 224 MG/DL (ref 100–199)
CO2 SERPL-SCNC: 25 MMOL/L (ref 20–29)
CREAT SERPL-MCNC: 0.71 MG/DL (ref 0.57–1)
EGFR: 123 ML/MIN/1.73
EOSINOPHIL # BLD AUTO: 0.2 X10E3/UL (ref 0–0.4)
EOSINOPHIL NFR BLD AUTO: 3 %
ERYTHROCYTE [DISTWIDTH] IN BLOOD BY AUTOMATED COUNT: 12 % (ref 11.7–15.4)
GLOBULIN SER-MCNC: 2.3 G/DL (ref 1.5–4.5)
GLUCOSE SERPL-MCNC: 90 MG/DL (ref 70–99)
HCT VFR BLD AUTO: 38.8 % (ref 34–46.6)
HDLC SERPL-MCNC: 77 MG/DL
HGB BLD-MCNC: 13.3 G/DL (ref 11.1–15.9)
IMM GRANULOCYTES # BLD: 0 X10E3/UL (ref 0–0.1)
IMM GRANULOCYTES NFR BLD: 0 %
LDLC SERPL CALC-MCNC: 120 MG/DL (ref 0–99)
LDLC/HDLC SERPL: 1.6 RATIO (ref 0–3.2)
LYMPHOCYTES # BLD AUTO: 3 X10E3/UL (ref 0.7–3.1)
LYMPHOCYTES NFR BLD AUTO: 47 %
MCH RBC QN AUTO: 29.5 PG (ref 26.6–33)
MCHC RBC AUTO-ENTMCNC: 34.3 G/DL (ref 31.5–35.7)
MCV RBC AUTO: 86 FL (ref 79–97)
MONOCYTES # BLD AUTO: 0.6 X10E3/UL (ref 0.1–0.9)
MONOCYTES NFR BLD AUTO: 10 %
NEUTROPHILS # BLD AUTO: 2.4 X10E3/UL (ref 1.4–7)
NEUTROPHILS NFR BLD AUTO: 39 %
PLATELET # BLD AUTO: 230 X10E3/UL (ref 150–450)
POTASSIUM SERPL-SCNC: 4.3 MMOL/L (ref 3.5–5.2)
PROT SERPL-MCNC: 6.7 G/DL (ref 6–8.5)
RBC # BLD AUTO: 4.51 X10E6/UL (ref 3.77–5.28)
SL AMB VLDL CHOLESTEROL CALC: 27 MG/DL (ref 5–40)
SODIUM SERPL-SCNC: 138 MMOL/L (ref 134–144)
TRIGL SERPL-MCNC: 159 MG/DL (ref 0–149)
TSH SERPL DL<=0.005 MIU/L-ACNC: 2.45 UIU/ML (ref 0.45–4.5)
WBC # BLD AUTO: 6.2 X10E3/UL (ref 3.4–10.8)

## 2024-01-12 ENCOUNTER — OFFICE VISIT (OUTPATIENT)
Dept: FAMILY MEDICINE CLINIC | Facility: CLINIC | Age: 24
End: 2024-01-12
Payer: COMMERCIAL

## 2024-01-12 VITALS
DIASTOLIC BLOOD PRESSURE: 64 MMHG | HEIGHT: 66 IN | SYSTOLIC BLOOD PRESSURE: 136 MMHG | HEART RATE: 85 BPM | TEMPERATURE: 98.7 F | BODY MASS INDEX: 29.41 KG/M2 | WEIGHT: 183 LBS | OXYGEN SATURATION: 100 %

## 2024-01-12 DIAGNOSIS — Z11.59 NEED FOR HEPATITIS C SCREENING TEST: ICD-10-CM

## 2024-01-12 DIAGNOSIS — E78.00 HIGH CHOLESTEROL: ICD-10-CM

## 2024-01-12 DIAGNOSIS — E66.3 OVERWEIGHT (BMI 25.0-29.9): ICD-10-CM

## 2024-01-12 DIAGNOSIS — Z11.4 ENCOUNTER FOR SCREENING FOR HIV: ICD-10-CM

## 2024-01-12 DIAGNOSIS — Z00.00 ANNUAL PHYSICAL EXAM: Primary | ICD-10-CM

## 2024-01-12 PROBLEM — M79.671 PAIN IN BOTH FEET: Status: RESOLVED | Noted: 2018-08-06 | Resolved: 2024-01-12

## 2024-01-12 PROBLEM — M79.672 PAIN IN BOTH FEET: Status: RESOLVED | Noted: 2018-08-06 | Resolved: 2024-01-12

## 2024-01-12 PROBLEM — M21.969 ACQUIRED DEFORMITY OF FOOT: Status: RESOLVED | Noted: 2018-08-06 | Resolved: 2024-01-12

## 2024-01-12 PROCEDURE — 99395 PREV VISIT EST AGE 18-39: CPT | Performed by: FAMILY MEDICINE

## 2024-01-12 NOTE — PATIENT INSTRUCTIONS
The MIND diet is a variation and combination of a healthy Mediterranean and DASH diet with scientific evidence to support neurological and mental health. Thus it is an excellent healthy eating pattern for many people with neurological disorders such as peripheral neuropathy, nerve injuries such as Bell's Palsy,  cerebrovascular accidents (strokes), cognitive impairment, early dementia. It janice also be helpful in mental health issues such as depression, anxiety, neurodevelopmental disorders such as ADHD, Autism spectrum.     Your doctor and/or nutritionist can individualize the  MIND diet to meet your specific clinical situation.     Note that if your are diabetic and you are on blood sugar lowering medications such as insulin or sulfonylureas (like glipizide, glimepiride, etc), this diet is so effective at improving and even reversing diabetes that your need for medication may go way down. To avoid very low blood sugar (hypoglycemia),  be sure to monitor your blood sugar very closely and adjust down your medications if needed under the guidance of your physician.         [email or web address].[email or web address]-[email or web address]%[email or web address]    Here are the key aspects of the MIND diet:    Eat whole, unprocessed food, mainly plants. 'Eat the rainbow' - eat different color veggies to get the full range of their nutrients. Vegetables should be about 1/2 of the plate, 1/4 of plate a (mainly plant based) protein source such as tofu), 1/4 of plate healthy starch or carb such as a whole grain or root vegetable.     Eat less animal products, and choose healthy sources, such as healthy fish (e.g. sardines, mackerel, herring, flounder, high quality salmon).  No more than the size of a pack of cards of animal products per day. Avoid all industrially produced (feedlot) red meat and poultry.     Eat plenty of these key foods that  support brain, nerve and mental health:  Avocados  Beans  Blueberries and other berries  Broccoli, Kale and other leafy greens are key: have a minimum of 1 serving a day in addition to other veggies  Extra-virgin olive oil  Flax seed (grind just prior to eating and toss on breakfast cereal, in salads). Don't heat it because the omega-3 fatty acids are very delicate.  Herbal teas: hibiscus, lemon balm, mint, etc.  Fresh herbs & spices like cilantro, dill, rosemary, thyme, oregano, basil, mint, parsley; cinnamon, oregano, marjoram, allspice, saffron and others. Turmeric is especially anti-inflammatory - adding a small amount of black pepper will help its absorption. Many herbs and spices are rich in anti-oxidants  Nuts & seeds, e.g. almonds, walnuts, shea seeds, pumpkin, sunflower seeds. A handful a day will give you essential fatty acids and minerals like magnesium.  Whole grains: oats, buckwheat, millet, teff, sorghum, amaranth. Quinoa is the only grain that is a complete protein source and therefore is a great staple grain. Whole wheat in moderation is ok. AVOID all white flour products, as they are often largely empty calories deficient in micronutrients and are high in glycemic index that induce inflammation and over-eating.  Sweet potatoes (yams) are preferred over white potatoes.      AVOID or at least minimize all these:  Processed foods: chips, cookies, frozen dinners, white bread, bagels, pastries, sweets and similar bakery products   Fast food, fried foods. Avoid all fast food restaurants like [employer], [employer], [employer], [employer], most Chinese take-out and similar cheap unhealthy food establishments  Processed and industrially produced meats: goldsmith, salami, pastrami, hot dogs, luncheon meats are filled with saturated fat and sodium that induce nerve and brain inflammation and damage. Occasional free range poultry or grass fed bison or wild game meat like venison (e.g. once or twice a week, 6 oz. -   the size of a pack of cards) is probably ok. Do not eat regular chicken, which is the main source of cholesterol in the standard American diet and a major contributor to obesity and nerve/brain inflammation.  Butter or margarine. These are high in saturated or dangerous trans fats  Cheese is high in saturated fat. Use not at all or minimally, such as a little sprinkle of a highly flavored cheese on food. Don't eat any food in which cheese is a main ingredient.    Sugary drinks such as soda. Also avoid artificially sweetened sodas, which confuse our energy balancing mechanism and disturb our gut microbiome  Excess alcohol. Don't drink at all if there is a contraindication to drinking such as alcohol use disorder, liver disease, etc., Men should never exceed two drinks per day, women no more than 1 drink per day. A drink = one small glass of wine, one 12 oz. Beer or one shot of hard liquor.    Here are some resources to learn more about the MIND diet and improving brain and nerve health:  The Mind diet cookbook 2021 by Jany Sierra. Brief intro then a year's work of nice recipes   The Alzheimer's Solution by Vijay and Wendie Soriano MD. Copyright 2017. Reviews healthy lifestyle as relates to brain health. Can use to help prevent/support healing of many neurological and mental health disorders, not just dementia. Has nice recipes in back of book.  How not to Die by Phil Mckeon MD Copyright 2015. Evidence based nutrition to help prevent and support healing of many diseases. Has a  Cookbook. Useful web site: NutritionFacts.org      Smoothie for brain & nerve health (could have one a day):  Place in :   About 1 cup or more of water, milk, almond or soy milk, adjust for proper consistency  1 cup fresh kale or spinach or other dark leafy green  1 cup berries such as blueberries, blackberries or raspberries  1 very ripe banana or other fruit like gustavo, or other fruit of your choice  1 - 2 tablespoons of  freshly ground flaxseed  1/4 cup nuts such as walnuts, almonds, hazelnuts, etc.     Can also add per your preference:  Freshly ground Aditya or other seeds  Various spices: a dash of nutmeg, 1/2 tsp or so of turmeric, cinnamon. cardamom, coriander, etc.   Flavors such as vanilla, cocoa powder (with a little sugar, like 1 teaspoon for palatability if needed)    If you have diabetes, add 1/2 tsp of cinnamon and sip slowly over 1 hour to avoid a sugar rush.     Have fun experimenting and adjusting to your taste!    The MIND diet should be part of a comprehensive program to help support healing from your medical condition. Be sure this is under the guidance of your health care provider.

## 2024-01-12 NOTE — ASSESSMENT & PLAN NOTE
Last lipid panel on 02/28/23 showed : Cholesterol 224, triglycerides 159,   Counseled patient on the importance of diet and exercise  No need to start medication at this time  Follow up repeat lipid panel, CBC and CMP  Follow up in 6 months

## 2024-01-12 NOTE — ASSESSMENT & PLAN NOTE
Counseled patient on the importance of working to achieve weight reduction goal. Discussed benefits of weight loss including prevention or control of comorbidities. Discussed role that balanced diet and daily activity play in weight reduction. Set up small attainable weight loss goals. Involve family, friends, and co-workers for support. Exercise should be 30 minutes 5 times weekly of moderate intensity activity, brisk walking acceptable. Recommended diet include mediterranean and DASH. Primary focus should be unprocessed foods, fresh fruits, vegetables, plant based fats and protein, legumes, whole grain and nuts. Limit red meats, fast food and eating out at restaurants.   Discussed with patient to start out making small changes and to find balance between banning and mindless eating. And to combine mindfulness about food decisions with a positive drive to eat healthy foods  Follow up in 6 months

## 2024-01-12 NOTE — PROGRESS NOTES
ADULT ANNUAL PHYSICAL  [facility]    NAME: Kely Steele  AGE: 23 y.o. SEX: female  : 2000     DATE: 2024     Assessment and Plan:     Problem List Items Addressed This Visit       Overweight (BMI 25.0-29.9)     Counseled patient on the importance of working to achieve weight reduction goal. Discussed benefits of weight loss including prevention or control of comorbidities. Discussed role that balanced diet and daily activity play in weight reduction. Set up small attainable weight loss goals. Involve family, friends, and co-workers for support. Exercise should be 30 minutes 5 times weekly of moderate intensity activity, brisk walking acceptable. Recommended diet include mediterranean and DASH. Primary focus should be unprocessed foods, fresh fruits, vegetables, plant based fats and protein, legumes, whole grain and nuts. Limit red meats, fast food and eating out at restaurants.   Discussed with patient to start out making small changes and to find balance between banning and mindless eating. And to combine mindfulness about food decisions with a positive drive to eat healthy foods  Follow up in 6 months         Relevant Orders    CBC and differential    Comprehensive metabolic panel    High cholesterol     Last lipid panel on 23 showed : Cholesterol 224, triglycerides 159,   Counseled patient on the importance of diet and exercise  No need to start medication at this time  Follow up repeat lipid panel, CBC and CMP  Follow up in 6 months         Relevant Orders    Lipid Panel with Direct LDL reflex     Other Visit Diagnoses       Annual physical exam    -  Primary    Need for hepatitis C screening test        Relevant Orders    Hepatitis C antibody    Encounter for screening for HIV        Relevant Orders    HIV 1/2 AG/AB W REFLEX [facility] and [facility] only            Immunizations and preventive care screenings were discussed with patient  today. Appropriate education was printed on patient's after visit summary.    Counseling:  Exercise: the importance of regular exercise/physical activity was discussed. Recommend exercise 3-5 times per week for at least 30 minutes.   Alcohol/drug use: discussed moderation in alcohol intake, the recommendations for healthy alcohol use, and avoidance of illicit drug use.  Dental Health: discussed importance of regular tooth brushing, flossing, and dental visits.  Sexual health: discussed sexually transmitted diseases, partner selection, use of condoms, avoidance of unintended pregnancy, and contraceptive alternatives.  Diet :  Recommended diet include mediterranean and DASH. Primary focus should be unprocessed foods, fresh fruits & vegetables, plant based fats and protein, legumes, whole grain and nuts. Vegetables and fruit should make up 1/2 each meal. Limit red meats, fast food and eating out at restaurants.       Depression Screening and Follow-up Plan: Patient was screened for depression during today's encounter. They screened negative with a PHQ-2 score of 0.        Return in about 6 months (around 2024) for Next scheduled follow up.     Chief Complaint:     Chief Complaint   Patient presents with    Physical Exam      History of Present Illness:     Adult Annual Physical   Patient here for a comprehensive physical exam. The patient reports no problems.    Diet and Physical Activity  Diet/Nutrition: frequent junk food and limited fruits/vegetables.   Exercise: no formal exercise.      Depression Screening  PHQ-2/9 Depression Screening    Little interest or pleasure in doing things: 0 - not at all  Feeling down, depressed, or hopeless: 0 - not at all  Trouble falling or staying asleep, or sleeping too much: 0 - not at all  Feeling tired or having little energy: 0 - not at all  Poor appetite or overeatin - not at all  Feeling bad about yourself - or that you are a failure or have let yourself or your  family down: 0 - not at all  Trouble concentrating on things, such as reading the newspaper or watching television: 1 - several days  Moving or speaking so slowly that other people could have noticed. Or the opposite - being so fidgety or restless that you have been moving around a lot more than usual: 0 - not at all  Thoughts that you would be better off dead, or of hurting yourself in some way: 0 - not at all  PHQ-2 Score: 0  PHQ-2 Interpretation: Negative depression screen  PHQ-9 Score: 1  PHQ-9 Interpretation: No or Minimal depression       General Health  Sleep: gets 7-8 hours of sleep on average.   Hearing: normal - bilateral.  Vision: no vision problems.   Dental: regular dental visits and brushes teeth twice daily.       /GYN Health  Follows with gynecology? yes   Last menstrual period: 12/23/23, regular periods, duration 4 to 6 days  Contraceptive method: oral contraceptives.  History of STDs?: no.     Advanced Care Planning  Do you have an advanced directive? no  Do you have a durable medical power of ? no     Review of Systems:     Review of Systems   Constitutional:  Negative for chills and fever.   HENT:  Negative for ear pain and sore throat.    Eyes:  Negative for pain and visual disturbance.   Respiratory: Negative.  Negative for cough and shortness of breath.    Cardiovascular: Negative.  Negative for chest pain, palpitations and leg swelling.   Gastrointestinal: Negative.  Negative for abdominal pain and vomiting.   Genitourinary:  Negative for dysuria and hematuria.   Musculoskeletal:  Negative for arthralgias and back pain.   Skin:  Negative for color change and rash.   Neurological:  Negative for seizures and syncope.   Hematological: Negative.  Negative for adenopathy. Does not bruise/bleed easily.   Psychiatric/Behavioral: Negative.  Negative for sleep disturbance. The patient is not nervous/anxious.    All other systems reviewed and are negative.     Past Medical History:     Past  Medical History:   Diagnosis Date    Ear lump     last assessed 2/27/15    Exercise-induced bronchospasm     No known problems 08/01/2014      Past Surgical History:     History reviewed. No pertinent surgical history.   Social History:     Social History     Socioeconomic History    Marital status: Single     Spouse name: None    Number of children: None    Years of education: None    Highest education level: None   Occupational History    None   Tobacco Use    Smoking status: Never    Smokeless tobacco: Never   Substance and Sexual Activity    Alcohol use: Yes     Comment: rare    Drug use: No    Sexual activity: None   Other Topics Concern    None   Social History Narrative    Currently in school    Lives with parents (living together,)     Social Determinants of Health     Financial Resource Strain: Low Risk  (2/14/2023)    Received from [facility]    Overall Financial Resource Strain (CARDIA)     Difficulty of Paying Living Expenses: Not hard at all   Food Insecurity: Unknown (2/14/2023)    Received from [facility]    Hunger Vital Sign     Worried About Running Out of Food in the Last Year: Never true     Ran Out of Food in the Last Year: Not on file   Transportation Needs: Unknown (2/14/2023)    Received from [facility]    PRAPARE - Transportation     Lack of Transportation (Medical): No     Lack of Transportation (Non-Medical): Not on file   Physical Activity: Not on file   Stress: Not on file   Social Connections: Unknown (2/14/2023)    Received from [facility]    Social Connection and Isolation Panel [NHANES]     Frequency of Communication with Friends and Family: Three times a week     Frequency of Social Gatherings with Friends and Family: Not on file     Attends [employer] Services: Not on file     Active Member of Clubs or Organizations: Not on file     Attends Club or Organization Meetings: Not on file     Marital Status: Not on file   Intimate Partner Violence: Unknown  "(2/14/2023)    Received from [facility]    Humiliation, Afraid, Rape, and Kick questionnaire     Fear of Current or Ex-Partner: No     Emotionally Abused: Not on file     Physically Abused: Not on file     Sexually Abused: Not on file   Housing Stability: Unknown (2/14/2023)    Received from [facility]    Housing Stability Vital Sign     Unable to Pay for Housing in the Last Year: No     Number of Places Lived in the Last Year: Not on file     Unstable Housing in the Last Year: Not on file      Family History:     Family History   Problem Relation Age of Onset    Hypertension Mother     Asthma Father     Diabetes Father     Asthma Maternal Grandmother     Diabetes Maternal Grandmother     Lung cancer Maternal Grandmother     Breast cancer Paternal Grandmother     Lung cancer Paternal Grandfather     Diabetes Paternal Grandfather       Current Medications:     Current Outpatient Medications   Medication Sig Dispense Refill    norgestimate-ethinyl estradiol (Ortho Tri-Cyclen Lo) 0.18/0.215/0.25 MG-25 MCG per tablet Take 1 tablet by mouth daily 28 tablet 5    fluticasone (FLONASE) 50 mcg/act nasal spray 1 spray into each nostril daily (Patient not taking: Reported on 10/31/2022) 11.1 mL 0     No current facility-administered medications for this visit.      Allergies:     No Known Allergies   Physical Exam:     /64 (BP Location: Left arm, Patient Position: Sitting, Cuff Size: Standard)   Pulse 85   Temp 98.7 °F (37.1 °C) (Tympanic)   Ht 5' 6\" (1.676 m)   Wt 83 kg (183 lb)   SpO2 100%   BMI 29.54 kg/m²     Physical Exam  Vitals and nursing note reviewed.   Constitutional:       General: She is not in acute distress.     Appearance: Normal appearance. She is well-developed.   HENT:      Head: Normocephalic and atraumatic.      Right Ear: Tympanic membrane, ear canal and external ear normal.      Left Ear: Tympanic membrane, ear canal and external ear normal.      Nose: Nose normal.      " Mouth/Throat:      Mouth: Mucous membranes are moist.   Eyes:      Extraocular Movements: Extraocular movements intact.      Conjunctiva/sclera: Conjunctivae normal.      Pupils: Pupils are equal, round, and reactive to light.   Cardiovascular:      Rate and Rhythm: Normal rate and regular rhythm.      Pulses: Normal pulses.      Heart sounds: Normal heart sounds. No murmur heard.     No friction rub. No gallop.   Pulmonary:      Effort: Pulmonary effort is normal. No respiratory distress.      Breath sounds: No stridor. No wheezing, rhonchi or rales.   Chest:      Chest wall: No tenderness.   Abdominal:      General: Bowel sounds are normal. There is no distension.      Palpations: Abdomen is soft. There is no mass.      Tenderness: There is no abdominal tenderness. There is no right CVA tenderness, left CVA tenderness, guarding or rebound.      Hernia: No hernia is present.   Musculoskeletal:         General: No swelling. Normal range of motion.      Cervical back: Normal range of motion and neck supple.   Skin:     General: Skin is warm and dry.      Capillary Refill: Capillary refill takes less than 2 seconds.   Neurological:      General: No focal deficit present.      Mental Status: She is alert and oriented to person, place, and time.   Psychiatric:         Mood and Affect: Mood normal.         Behavior: Behavior normal.         Thought Content: Thought content normal.         Judgment: Judgment normal.          Shayne Hameed MD   [facility]

## 2024-02-03 LAB
ALBUMIN SERPL-MCNC: 4.4 G/DL (ref 4–5)
ALBUMIN/GLOB SERPL: 1.9 {RATIO} (ref 1.2–2.2)
ALP SERPL-CCNC: 33 IU/L (ref 44–121)
ALT SERPL-CCNC: 11 IU/L (ref 0–32)
AST SERPL-CCNC: 15 IU/L (ref 0–40)
BASOPHILS # BLD AUTO: 0 X10E3/UL (ref 0–0.2)
BASOPHILS NFR BLD AUTO: 1 %
BILIRUB SERPL-MCNC: 0.6 MG/DL (ref 0–1.2)
BUN SERPL-MCNC: 10 MG/DL (ref 6–20)
BUN/CREAT SERPL: 14 (ref 9–23)
CALCIUM SERPL-MCNC: 9.4 MG/DL (ref 8.7–10.2)
CHLORIDE SERPL-SCNC: 104 MMOL/L (ref 96–106)
CHOLEST SERPL-MCNC: 207 MG/DL (ref 100–199)
CO2 SERPL-SCNC: 23 MMOL/L (ref 20–29)
CREAT SERPL-MCNC: 0.71 MG/DL (ref 0.57–1)
EGFR: 122 ML/MIN/1.73
EOSINOPHIL # BLD AUTO: 0.1 X10E3/UL (ref 0–0.4)
EOSINOPHIL NFR BLD AUTO: 1 %
ERYTHROCYTE [DISTWIDTH] IN BLOOD BY AUTOMATED COUNT: 11.7 % (ref 11.7–15.4)
GLOBULIN SER-MCNC: 2.3 G/DL (ref 1.5–4.5)
GLUCOSE SERPL-MCNC: 92 MG/DL (ref 70–99)
HCT VFR BLD AUTO: 37.9 % (ref 34–46.6)
HCV AB S/CO SERPL IA: NON REACTIVE
HDLC SERPL-MCNC: 74 MG/DL
HGB BLD-MCNC: 13.4 G/DL (ref 11.1–15.9)
HIV 1+2 AB+HIV1 P24 AG SERPL QL IA: NON REACTIVE
IMM GRANULOCYTES # BLD: 0 X10E3/UL (ref 0–0.1)
IMM GRANULOCYTES NFR BLD: 0 %
LDLC SERPL CALC-MCNC: 117 MG/DL (ref 0–99)
LDLC/HDLC SERPL: 1.6 RATIO (ref 0–3.2)
LYMPHOCYTES # BLD AUTO: 2.3 X10E3/UL (ref 0.7–3.1)
LYMPHOCYTES NFR BLD AUTO: 37 %
MCH RBC QN AUTO: 30.4 PG (ref 26.6–33)
MCHC RBC AUTO-ENTMCNC: 35.4 G/DL (ref 31.5–35.7)
MCV RBC AUTO: 86 FL (ref 79–97)
MONOCYTES # BLD AUTO: 0.5 X10E3/UL (ref 0.1–0.9)
MONOCYTES NFR BLD AUTO: 8 %
NEUTROPHILS # BLD AUTO: 3.4 X10E3/UL (ref 1.4–7)
NEUTROPHILS NFR BLD AUTO: 53 %
PLATELET # BLD AUTO: 276 X10E3/UL (ref 150–450)
POTASSIUM SERPL-SCNC: 4.7 MMOL/L (ref 3.5–5.2)
PROT SERPL-MCNC: 6.7 G/DL (ref 6–8.5)
RBC # BLD AUTO: 4.41 X10E6/UL (ref 3.77–5.28)
SL AMB VLDL CHOLESTEROL CALC: 16 MG/DL (ref 5–40)
SODIUM SERPL-SCNC: 142 MMOL/L (ref 134–144)
TRIGL SERPL-MCNC: 88 MG/DL (ref 0–149)
WBC # BLD AUTO: 6.4 X10E3/UL (ref 3.4–10.8)

## 2025-06-16 ENCOUNTER — OFFICE VISIT (OUTPATIENT)
Age: 25
End: 2025-06-16

## 2025-06-16 VITALS
WEIGHT: 197 LBS | OXYGEN SATURATION: 99 % | HEIGHT: 66 IN | HEART RATE: 80 BPM | BODY MASS INDEX: 31.66 KG/M2 | DIASTOLIC BLOOD PRESSURE: 84 MMHG | RESPIRATION RATE: 16 BRPM | TEMPERATURE: 98 F | SYSTOLIC BLOOD PRESSURE: 120 MMHG

## 2025-06-16 DIAGNOSIS — Z11.3 SCREENING FOR STDS (SEXUALLY TRANSMITTED DISEASES): ICD-10-CM

## 2025-06-16 DIAGNOSIS — Z00.00 ANNUAL PHYSICAL EXAM: Primary | ICD-10-CM

## 2025-06-16 PROCEDURE — 99385 PREV VISIT NEW AGE 18-39: CPT | Performed by: FAMILY MEDICINE

## 2025-06-16 NOTE — PROGRESS NOTES
Adult Annual Physical  Name: Kely Steele      : 2000      MRN: [id number]  Encounter Provider: Riaz Lozano MD  Encounter Date: 2025   Encounter department: [facility]    :  Assessment & Plan  Annual physical exam  Healthy 24 year old female presenting for annual physical exam. No acute concerns. Age appropriate guidelines and screenings discussed. Repeat lipid panel and urine G/C screening ordered.        BMI 30.0-30.9,adult  Continued counseling on exercise and diet modifications.    Orders:    Lipid Panel with Direct LDL reflex; Future    Screening for STDs (sexually transmitted diseases)    Orders:    Chlamydia/GC amplified DNA by PCR; Future        Preventive Screenings:  - Diabetes Screening: screening up-to-date  - Cholesterol Screening: screening not indicated and has hyperlipidemia   - Chlamydia Screening: risks/benefits discussed and orders placed   - Hepatitis C screening: screening up-to-date   - HIV screening: screening up-to-date   - Cervical cancer screening: screening up-to-date   - Colon cancer screening: screening not indicated   - Lung cancer screening: screening not indicated     Counseling/Anticipatory Guidance:  - Alcohol: discussed moderation in alcohol intake and recommendations for healthy alcohol use.   - Drug use: discussed harms of illicit drug use and how it can negatively impact mental/physical health.   - Tobacco use: discussed harms of tobacco use and management options for quitting.   - Dental health: discussed importance of regular tooth brushing, flossing, and dental visits.   - Sexual health: discussed sexually transmitted diseases, partner selection, use of condoms, avoidance of unintended pregnancy, and contraceptive alternatives.   - Diet: discussed recommendations for a healthy/well-balanced diet.   - Exercise: the importance of regular exercise/physical activity was discussed. Recommend exercise 3-5 times per week for at  least 30 minutes.   - Injury prevention: discussed safety/seat belts, safety helmets, smoke detectors, carbon monoxide detectors, and smoking near bedding or upholstery.     BMI Counseling: Body mass index is 31.8 kg/m². The BMI is above normal. Nutrition recommendations include encouraging healthy choices of fruits and vegetables, decreasing fast food intake, consuming healthier snacks, limiting drinks that contain sugar and moderation in carbohydrate intake. Exercise recommendations include moderate physical activity 150 minutes/week. No pharmacotherapy was ordered. Rationale for BMI follow-up plan is due to patient being overweight or obese.     Depression Screening and Follow-up Plan: Patient was screened for depression during today's encounter. They screened negative with a PHQ-2 score of 0.          History of Present Illness     Adult Annual Physical:  Patient presents for annual physical. Occasional neck pain when waking up in the morning; discussed continuing to monitor for symptom changes. .     Diet and Physical Activity:  - Diet/Nutrition: no special diet.  - Exercise: walking and 1-2 times a week on average.    Depression Screening:  - PHQ-2 Score: 0    General Health:  - Sleep: 7-8 hours of sleep on average.  - Hearing: normal hearing right ear and normal hearing left ear.  - Vision: no vision problems.  - Dental: regular dental visits, floss regularly and brushes teeth twice daily.    /GYN Health:  - Follows with GYN: yes.   - History of STDs: no    Review of Systems   Constitutional:  Negative for chills and fever.   HENT:  Negative for congestion and sore throat.    Eyes:  Negative for pain and visual disturbance.   Respiratory:  Negative for cough, shortness of breath and wheezing.    Cardiovascular:  Negative for chest pain and palpitations.   Gastrointestinal:  Negative for abdominal pain, constipation, diarrhea, nausea and vomiting.   Genitourinary:  Negative for dysuria and hematuria.  "  Musculoskeletal:  Positive for neck pain (occasional, mild and relived with OTC methods). Negative for arthralgias and back pain.   Skin:  Negative for color change and rash.   Neurological:  Negative for dizziness, syncope and headaches.   All other systems reviewed and are negative.        Objective   /84 (BP Location: Left arm, Patient Position: Sitting, Cuff Size: Large)   Pulse 80   Temp 98 °F (36.7 °C) (Tympanic)   Resp 16   Ht 5' 6\" (1.676 m)   Wt 89.4 kg (197 lb)   SpO2 99%   BMI 31.80 kg/m²     Physical Exam  Vitals reviewed.   Constitutional:       General: She is not in acute distress.     Appearance: Normal appearance. She is not ill-appearing or toxic-appearing.   HENT:      Head: Normocephalic and atraumatic.      Right Ear: Tympanic membrane normal.      Left Ear: Tympanic membrane normal.      Nose: Nose normal.      Mouth/Throat:      Mouth: Mucous membranes are moist.      Pharynx: Oropharynx is clear.     Eyes:      Extraocular Movements: Extraocular movements intact.      Conjunctiva/sclera: Conjunctivae normal.      Pupils: Pupils are equal, round, and reactive to light.       Cardiovascular:      Rate and Rhythm: Normal rate and regular rhythm.      Pulses: Normal pulses.   Pulmonary:      Effort: Pulmonary effort is normal. No respiratory distress.      Breath sounds: Normal breath sounds.   Abdominal:      General: Abdomen is flat. There is no distension.      Palpations: Abdomen is soft.      Tenderness: There is no abdominal tenderness.     Musculoskeletal:         General: No swelling or signs of injury. Normal range of motion.      Cervical back: Normal range of motion and neck supple.      Right lower leg: No edema.      Left lower leg: No edema.     Skin:     General: Skin is warm and dry.     Neurological:      Mental Status: She is alert and oriented to person, place, and time.     Psychiatric:         Mood and Affect: Mood normal.         "

## 2025-06-16 NOTE — PATIENT INSTRUCTIONS
"Patient Education     Routine physical for adults   The Basics   Written by the doctors and editors at [facility]   What is a physical? -- A physical is a routine visit, or \"check-up,\" with your doctor. You might also hear it called a \"wellness visit\" or \"preventive visit.\"  During each visit, the doctor will:   Ask about your physical and mental health   Ask about your habits, behaviors, and lifestyle   Do an exam   Give you vaccines if needed   Talk to you about any medicines you take   Give advice about your health   Answer your questions  Getting regular check-ups is an important part of taking care of your health. It can help your doctor find and treat any problems you have. But it's also important for preventing health problems.  A routine physical is different from a \"sick visit.\" A sick visit is when you see a doctor because of a health concern or problem. Since physicals are scheduled ahead of time, you can think about what you want to ask the doctor.  How often should I get a physical? -- It depends on your age and health. In general, for people age 21 years and older:   If you are younger than 50 years, you might be able to get a physical every 3 years.   If you are 50 years or older, your doctor might recommend a physical every year.  If you have an ongoing health condition, like diabetes or high blood pressure, your doctor will probably want to see you more often.  What happens during a physical? -- In general, each visit will include:   Physical exam - The doctor or nurse will check your height, weight, heart rate, and blood pressure. They will also look at your eyes and ears. They will ask about how you are feeling and whether you have any symptoms that bother you.   Medicines - It's a good idea to bring a list of all the medicines you take to each doctor visit. Your doctor will talk to you about your medicines and answer any questions. Tell them if you are having any side effects that bother you. You " "should also tell them if you are having trouble paying for any of your medicines.   Habits and behaviors - This includes:   Your diet   Your exercise habits   Whether you smoke, drink alcohol, or use drugs   Whether you are sexually active   Whether you feel safe at home  Your doctor will talk to you about things you can do to improve your health and lower your risk of health problems. They will also offer help and support. For example, if you want to quit smoking, they can give you advice and might prescribe medicines. If you want to improve your diet or get more physical activity, they can help you with this, too.   Lab tests, if needed - The tests you get will depend on your age and situation. For example, your doctor might want to check your:   Cholesterol   Blood sugar   Iron level   Vaccines - The recommended vaccines will depend on your age, health, and what vaccines you already had. Vaccines are very important because they can prevent certain serious or deadly infections.   Discussion of screening - \"Screening\" means checking for diseases or other health problems before they cause symptoms. Your doctor can recommend screening based on your age, risk, and preferences. This might include tests to check for:   Cancer, such as breast, prostate, cervical, ovarian, colorectal, prostate, lung, or skin cancer   Sexually transmitted infections, such as chlamydia and gonorrhea   Mental health conditions like depression and anxiety  Your doctor will talk to you about the different types of screening tests. They can help you decide which screenings to have. They can also explain what the results might mean.   Answering questions - The physical is a good time to ask the doctor or nurse questions about your health. If needed, they can refer you to other doctors or specialists, too.  Adults older than 65 years often need other care, too. As you get older, your doctor will talk to you about:   How to prevent falling at " home   Hearing or vision tests   Memory testing   How to take your medicines safely   Making sure that you have the help and support you need at home  All topics are updated as new evidence becomes available and our peer review process is complete.  This topic retrieved from [employer] on: May 02, 2024.  Topic 501097 Version 1.0  Release: 32.4.3 - C32.122  © 2024 UpToDate, Inc. and/or its affiliates. All rights reserved.  Consumer Information Use and Disclaimer   Disclaimer: This generalized information is a limited summary of diagnosis, treatment, and/or medication information. It is not meant to be comprehensive and should be used as a tool to help the user understand and/or assess potential diagnostic and treatment options. It does NOT include all information about conditions, treatments, medications, side effects, or risks that may apply to a specific patient. It is not intended to be medical advice or a substitute for the medical advice, diagnosis, or treatment of a health care provider based on the health care provider's examination and assessment of a patient's specific and unique circumstances. Patients must speak with a health care provider for complete information about their health, medical questions, and treatment options, including any risks or benefits regarding use of medications. This information does not endorse any treatments or medications as safe, effective, or approved for treating a specific patient. UpToDate, Inc. and its affiliates disclaim any warranty or liability relating to this information or the use thereof.The use of this information is governed by the Terms of Use, available at https://www.[employer] Solutionsuwer.com/en/know/clinical-effectiveness-terms. 2024© UpToDate, Inc. and its affiliates and/or licensors. All rights reserved.  Copyright   © 2024 UpToDate, Inc. and/or its affiliates. All rights reserved.